# Patient Record
Sex: FEMALE | Race: WHITE | NOT HISPANIC OR LATINO
[De-identification: names, ages, dates, MRNs, and addresses within clinical notes are randomized per-mention and may not be internally consistent; named-entity substitution may affect disease eponyms.]

---

## 2017-12-28 PROBLEM — Z00.00 ENCOUNTER FOR PREVENTIVE HEALTH EXAMINATION: Status: ACTIVE | Noted: 2017-12-28

## 2023-06-15 ENCOUNTER — APPOINTMENT (OUTPATIENT)
Dept: ORTHOPEDIC SURGERY | Facility: CLINIC | Age: 48
End: 2023-06-15

## 2023-08-08 ENCOUNTER — APPOINTMENT (OUTPATIENT)
Dept: UROLOGY | Facility: CLINIC | Age: 48
End: 2023-08-08
Payer: MEDICAID

## 2023-08-08 VITALS
DIASTOLIC BLOOD PRESSURE: 93 MMHG | BODY MASS INDEX: 27.23 KG/M2 | HEIGHT: 62 IN | WEIGHT: 148 LBS | SYSTOLIC BLOOD PRESSURE: 147 MMHG | OXYGEN SATURATION: 98 % | TEMPERATURE: 95.4 F | HEART RATE: 86 BPM

## 2023-08-08 DIAGNOSIS — Z86.39 PERSONAL HISTORY OF OTHER ENDOCRINE, NUTRITIONAL AND METABOLIC DISEASE: ICD-10-CM

## 2023-08-08 DIAGNOSIS — N89.8 OTHER SPECIFIED NONINFLAMMATORY DISORDERS OF VAGINA: ICD-10-CM

## 2023-08-08 DIAGNOSIS — N39.3 STRESS INCONTINENCE (FEMALE) (MALE): ICD-10-CM

## 2023-08-08 PROCEDURE — 99204 OFFICE O/P NEW MOD 45 MIN: CPT

## 2023-08-08 RX ORDER — ATORVASTATIN CALCIUM 80 MG/1
TABLET, FILM COATED ORAL
Refills: 0 | Status: ACTIVE | COMMUNITY

## 2023-08-08 RX ORDER — METFORMIN HYDROCHLORIDE 625 MG/1
TABLET ORAL
Refills: 0 | Status: ACTIVE | COMMUNITY

## 2023-08-13 NOTE — PHYSICAL EXAM
[General Appearance - Well Developed] : well developed [General Appearance - Well Nourished] : well nourished [Normal Appearance] : normal appearance [Well Groomed] : well groomed [General Appearance - In No Acute Distress] : no acute distress [Edema] : no peripheral edema [Respiration, Rhythm And Depth] : normal respiratory rhythm and effort [Exaggerated Use Of Accessory Muscles For Inspiration] : no accessory muscle use [Abdomen Soft] : soft [Abdomen Tenderness] : non-tender [Costovertebral Angle Tenderness] : no ~M costovertebral angle tenderness [Urethral Meatus] : the meatus of the urethra showed no abnormalities [Urinary Bladder Findings] : the bladder was normal on palpation [Normal Station and Gait] : the gait and station were normal for the patient's age [] : no rash [Oriented To Time, Place, And Person] : oriented to person, place, and time [Affect] : the affect was normal [Mood] : the mood was normal [Not Anxious] : not anxious [FreeTextEntry1] : neg CST, +UH, vaginal wall cyst, stage 1 cystocele, no apical or posterior prolapse

## 2023-08-13 NOTE — HISTORY OF PRESENT ILLNESS
[FreeTextEntry1] : 48 year old female here for incontinence. She reports when she sneezes, laughs and coughs she has leakage (does not always wear pads) and "it is very annoying". She verbalizes has been going on for many years >5 years.   Denies fevers, chills, nausea, diarrhea, constipation, hematuria, or dysuria  23 PVR - 31 ml   Force of stream: normal/strong  Hesitancy: no  intermittency: more recently yes  Dribbling: no Daytime frequency: 2-3x Nighttime frequency: usually no Dysuria: no Urgency: only in morning UUI:  no JERRY: yes Pad usage: intermittent pad usage  Straining to void: no Incomplete bladder emptying: yes UTI: no Hematuria: no  Stone disease: no STD: no Bowel Issue: no Fluid intake and diet: 1-2 cups coffee, 1-2x week seltzer water, 6 cups water, eats tomatoes, sometimes spicy foods, rarely eats citrus fruits  Pregnancies:  Prolapse sensation: no Most bothersome symptoms: leaking   PMH:  DM, cholesterol Surghx:  denies Famhx: sister, mom, and maternal and paternal grandmother with breast cancer  Social hx: nonsmoker, no ETOH,  ( x8, MCx2) Medication: metformin, atorvastatin, basiglar, trulicity  Allergies: denies

## 2023-08-13 NOTE — ASSESSMENT
[FreeTextEntry1] :   Impression/plan: 48 year old  female here for JERRY, does not always wear pads. Asymptomatic stage I cystocele. Vaginal wall cyst.    - vaginal cyst---> recommend MRI pelvis  -discussed interventions which include Kegel exercises, physical therapy and surgical options (sling, bulking agent) she opted to discuss with her  and will call the office if she would like to pursue next steps - recommend UDS prior to any surgical option  I, Dr. Raul Olguin, personally performed the evaluation and management (E/M) services for this new patient.  That E/M includes conducting the clinically appropriate initial history &/or exam, assessing all conditions, and establishing the plan of care.  Today, my SARAH Guillermina, was here to observe &/or participate in the visit & follow plan of care established by me.